# Patient Record
Sex: FEMALE | URBAN - METROPOLITAN AREA
[De-identification: names, ages, dates, MRNs, and addresses within clinical notes are randomized per-mention and may not be internally consistent; named-entity substitution may affect disease eponyms.]

---

## 2017-08-22 ENCOUNTER — EMERGENCY (EMERGENCY)
Facility: HOSPITAL | Age: 22
LOS: 1 days | Discharge: HOME | End: 2017-08-22
Attending: EMERGENCY MEDICINE | Admitting: EMERGENCY MEDICINE
Payer: MEDICAID

## 2017-08-22 VITALS
TEMPERATURE: 98 F | SYSTOLIC BLOOD PRESSURE: 124 MMHG | OXYGEN SATURATION: 100 % | HEART RATE: 87 BPM | DIASTOLIC BLOOD PRESSURE: 83 MMHG | RESPIRATION RATE: 16 BRPM

## 2017-08-22 VITALS
DIASTOLIC BLOOD PRESSURE: 78 MMHG | SYSTOLIC BLOOD PRESSURE: 114 MMHG | HEART RATE: 66 BPM | RESPIRATION RATE: 16 BRPM | OXYGEN SATURATION: 100 %

## 2017-08-22 LAB
ALBUMIN SERPL ELPH-MCNC: 4.3 G/DL — SIGNIFICANT CHANGE UP (ref 3.3–5)
ALP SERPL-CCNC: 38 U/L — LOW (ref 40–120)
ALT FLD-CCNC: 20 U/L — SIGNIFICANT CHANGE UP (ref 4–33)
APPEARANCE UR: CLEAR — SIGNIFICANT CHANGE UP
AST SERPL-CCNC: 17 U/L — SIGNIFICANT CHANGE UP (ref 4–32)
BACTERIA # UR AUTO: SIGNIFICANT CHANGE UP
BASOPHILS # BLD AUTO: 0.04 K/UL — SIGNIFICANT CHANGE UP (ref 0–0.2)
BASOPHILS NFR BLD AUTO: 0.3 % — SIGNIFICANT CHANGE UP (ref 0–2)
BILIRUB SERPL-MCNC: 0.2 MG/DL — SIGNIFICANT CHANGE UP (ref 0.2–1.2)
BILIRUB UR-MCNC: NEGATIVE — SIGNIFICANT CHANGE UP
BLOOD UR QL VISUAL: HIGH
BUN SERPL-MCNC: 13 MG/DL — SIGNIFICANT CHANGE UP (ref 7–23)
CALCIUM SERPL-MCNC: 9.3 MG/DL — SIGNIFICANT CHANGE UP (ref 8.4–10.5)
CHLORIDE SERPL-SCNC: 100 MMOL/L — SIGNIFICANT CHANGE UP (ref 98–107)
CO2 SERPL-SCNC: 23 MMOL/L — SIGNIFICANT CHANGE UP (ref 22–31)
COLOR SPEC: HIGH
CREAT SERPL-MCNC: 0.64 MG/DL — SIGNIFICANT CHANGE UP (ref 0.5–1.3)
EOSINOPHIL # BLD AUTO: 0.07 K/UL — SIGNIFICANT CHANGE UP (ref 0–0.5)
EOSINOPHIL NFR BLD AUTO: 0.6 % — SIGNIFICANT CHANGE UP (ref 0–6)
GLUCOSE SERPL-MCNC: 79 MG/DL — SIGNIFICANT CHANGE UP (ref 70–99)
GLUCOSE UR-MCNC: NEGATIVE — SIGNIFICANT CHANGE UP
HCT VFR BLD CALC: 45.5 % — HIGH (ref 34.5–45)
HGB BLD-MCNC: 15.2 G/DL — SIGNIFICANT CHANGE UP (ref 11.5–15.5)
IMM GRANULOCYTES # BLD AUTO: 0.04 # — SIGNIFICANT CHANGE UP
IMM GRANULOCYTES NFR BLD AUTO: 0.3 % — SIGNIFICANT CHANGE UP (ref 0–1.5)
KETONES UR-MCNC: SIGNIFICANT CHANGE UP
LEUKOCYTE ESTERASE UR-ACNC: SIGNIFICANT CHANGE UP
LYMPHOCYTES # BLD AUTO: 34.4 % — SIGNIFICANT CHANGE UP (ref 13–44)
LYMPHOCYTES # BLD AUTO: 4.36 K/UL — HIGH (ref 1–3.3)
MCHC RBC-ENTMCNC: 30.2 PG — SIGNIFICANT CHANGE UP (ref 27–34)
MCHC RBC-ENTMCNC: 33.4 % — SIGNIFICANT CHANGE UP (ref 32–36)
MCV RBC AUTO: 90.5 FL — SIGNIFICANT CHANGE UP (ref 80–100)
MONOCYTES # BLD AUTO: 0.91 K/UL — HIGH (ref 0–0.9)
MONOCYTES NFR BLD AUTO: 7.2 % — SIGNIFICANT CHANGE UP (ref 2–14)
MUCOUS THREADS # UR AUTO: SIGNIFICANT CHANGE UP
NEUTROPHILS # BLD AUTO: 7.25 K/UL — SIGNIFICANT CHANGE UP (ref 1.8–7.4)
NEUTROPHILS NFR BLD AUTO: 57.2 % — SIGNIFICANT CHANGE UP (ref 43–77)
NITRITE UR-MCNC: NEGATIVE — SIGNIFICANT CHANGE UP
NRBC # FLD: 0 — SIGNIFICANT CHANGE UP
PH UR: 6 — SIGNIFICANT CHANGE UP (ref 4.6–8)
PLATELET # BLD AUTO: 263 K/UL — SIGNIFICANT CHANGE UP (ref 150–400)
PMV BLD: 9.4 FL — SIGNIFICANT CHANGE UP (ref 7–13)
POTASSIUM SERPL-MCNC: 4.5 MMOL/L — SIGNIFICANT CHANGE UP (ref 3.5–5.3)
POTASSIUM SERPL-SCNC: 4.5 MMOL/L — SIGNIFICANT CHANGE UP (ref 3.5–5.3)
PROT SERPL-MCNC: 7.6 G/DL — SIGNIFICANT CHANGE UP (ref 6–8.3)
PROT UR-MCNC: 100 — HIGH
RBC # BLD: 5.03 M/UL — SIGNIFICANT CHANGE UP (ref 3.8–5.2)
RBC # FLD: 12 % — SIGNIFICANT CHANGE UP (ref 10.3–14.5)
RBC CASTS # UR COMP ASSIST: >50 — HIGH (ref 0–?)
SODIUM SERPL-SCNC: 140 MMOL/L — SIGNIFICANT CHANGE UP (ref 135–145)
SP GR SPEC: 1.03 — HIGH (ref 1–1.03)
SQUAMOUS # UR AUTO: SIGNIFICANT CHANGE UP
UROBILINOGEN FLD QL: NORMAL E.U. — SIGNIFICANT CHANGE UP (ref 0.1–0.2)
WBC # BLD: 12.67 K/UL — HIGH (ref 3.8–10.5)
WBC # FLD AUTO: 12.67 K/UL — HIGH (ref 3.8–10.5)
WBC UR QL: >50 — HIGH (ref 0–?)

## 2017-08-22 PROCEDURE — 99285 EMERGENCY DEPT VISIT HI MDM: CPT

## 2017-08-22 RX ORDER — OXYCODONE AND ACETAMINOPHEN 5; 325 MG/1; MG/1
1 TABLET ORAL ONCE
Qty: 0 | Refills: 0 | Status: DISCONTINUED | OUTPATIENT
Start: 2017-08-22 | End: 2017-08-22

## 2017-08-22 RX ORDER — SODIUM CHLORIDE 9 MG/ML
1000 INJECTION INTRAMUSCULAR; INTRAVENOUS; SUBCUTANEOUS ONCE
Qty: 0 | Refills: 0 | Status: COMPLETED | OUTPATIENT
Start: 2017-08-22 | End: 2017-08-22

## 2017-08-22 RX ORDER — ACETAMINOPHEN 500 MG
1000 TABLET ORAL ONCE
Qty: 0 | Refills: 0 | Status: COMPLETED | OUTPATIENT
Start: 2017-08-22 | End: 2017-08-22

## 2017-08-22 RX ADMIN — Medication 400 MILLIGRAM(S): at 20:15

## 2017-08-22 RX ADMIN — SODIUM CHLORIDE 1000 MILLILITER(S): 9 INJECTION INTRAMUSCULAR; INTRAVENOUS; SUBCUTANEOUS at 20:11

## 2017-08-22 NOTE — ED ADULT NURSE NOTE - CHPI ED SYMPTOMS NEG
no chills/no nausea/no vomiting/no tingling/no pain/no numbness/no decreased eating/drinking/no weakness

## 2017-08-22 NOTE — ED PROVIDER NOTE - PHYSICAL EXAMINATION
VS:  unremarkable    GEN - mild distress R lower back pain; A+O x3   HEAD - NC/AT     ENT - PEERL, EOMI, mucous membranes dry , no discharge      NECK: Neck supple, non-tender without lymphadenopathy, no masses, no JVD  PULM - CTA b/l,  symmetric breath sounds  COR -  normal heart sounds    ABD - , ND, NT, soft, no guarding, no rebound, no masses    BACK - no CVA tenderness, nontender spine   R SI area joint ttp.  (+)R PVM spasm.    EXTREMS - no edema, no deformity, warm and well perfused    SKIN - no rash or bruising      NEUROLOGIC - alert, CN 2-2 intact, sensation nl, motor 5/5 RUE/LUE/RLE/LLE.  Ambulatory.    IMP: VS:  unremarkable    GEN - mild distress R lower back pain; A+O x3   HEAD - NC/AT     ENT - PEERL, EOMI, mucous membranes dry , no discharge      NECK: Neck supple, non-tender without lymphadenopathy, no masses, no JVD  PULM - CTA b/l,  symmetric breath sounds  COR -  normal heart sounds    ABD - , ND, NT, soft, no guarding, no rebound, no masses    BACK - no CVA tenderness, nontender spine   R SI area joint ttp.  (+)R PVM spasm.    EXTREMS - no edema, no deformity, warm and well perfused    SKIN - no rash or bruising      NEUROLOGIC - alert, CN 2-2 intact, sensation nl, motor 5/5 RUE/LUE/RLE/LLE.  Ambulatory.

## 2017-08-22 NOTE — ED SUB INTERN NOTE - NS MSEMN COMPLAINT FT
23yo F w PMH of R side sciatic here s/p fall. Pt fell walking to bathroom after waking up this AM. She felt dizzy and bilateral legs became weak, she fell backwards on hips/glutes. Denies LOC and hitting head. Endorses left sided HA this AM that has since resolved and blurry vision. After falling pt took vicodin for back pain, but it provided minimal relief. Pain is on right side and radiates down leg, same as normal sciatic pain. Currently denies leg weakness 23yo F w PMH of R side sciatic here s/p fall. Pt fell walking to bathroom after waking up this AM. She felt dizzy and bilateral legs became weak, she fell backwards on hips/glutes. Denies LOC and hitting head. Endorses left sided HA this AM that has since resolved and blurry vision. After falling pt took vicodin for back pain, but it provided minimal relief. Pain is on right side and radiates down leg, same as normal sciatic pain. Currently denies leg weakness. Patient treated in [facility] during college but has not seen doctor since graduating this Summer, recently stopped PT for this reason. Has been treated with a muscle relaxer and vicodin for sciatic pain. Currently on period 21yo F w PMH of R side sciatic here s/p fall. Pt fell walking to bathroom after waking up this AM. She felt dizzy and legs became weak, fell backwards on hips/glutes. Denies LOC and hitting head. Endorses blurry vision ass w event. After falling pt took vicodin for back pain, but it provided minimal relief. Back pain on right side and radiates down leg, same as normal sciatic pain. Currently denies leg weakness. Patient treated in [facility] during college but has not seen doctor since graduating this Summer, recently stopped PT for this reason. Treated with a muscle relaxer and vicodin for sciatic pain. No other medical problems

## 2017-08-22 NOTE — ED SUB INTERN NOTE - MEDICAL DECISION MAKING DETAILS
21yo F w hx of sciatic s/p fall after waking up this AM. Physical exam significant for right sided SI tenderness, otherwise no focal neurological signs.  Causes of weakness/fall/dizziness include neurologically mediated (vaso-vagal syncope), orthostatic/hypovolemic, and cardiogenic (arrythmia). Mechanical fall also possible given hx of sciatic and discontinuation of physical therapy. Will obtain pregnancy test as well.  Plan: CBC, CMP, IVF, tylenol PO, EKG. No need for back imaging or head imaging at this time given that pt did not hit head and no surgical intervention required as patient is still ambulatory.

## 2017-08-22 NOTE — ED PROVIDER NOTE - OBJECTIVE STATEMENT
22F p/w syncope - pt got up out of bed to go to bathroom today, and felt dizzy then passed out, landing on her bottom, which worsened her chronic low back pain.  She's able to walk but with a limp.  Pt took some of her "Muscle relaxer" as well as pain medicine prior to driving here.  [facility] prior to event.  [place] is now here for the duration, having graduated from college.

## 2017-08-22 NOTE — ED PROVIDER NOTE - PROGRESS NOTE DETAILS
Luis: Sign out from Dr Kraft 8p- pt ambulating without limp.  requesting to go home.  lab shows wbc 12.67 without left shift.  EKG no arrhythmia.    Dx back pain. f/u with pcp, pain management and cardio.  left ambulatory.  motrin for pain. Luis: Sign out from Dr Kraft 8p- pt ambulating without limp.  requesting to go home.  lab shows wbc 12.67 without left shift. ua blood- having her period, no sign of infection  EKG no arrhythmia.    Dx back pain. f/u with pcp, pain management and cardio.  left ambulatory.  motrin for pain.

## 2017-08-22 NOTE — ED PROVIDER NOTE - MEDICAL DECISION MAKING DETAILS
22F p/w syncope and acute on chronic low back pain after falling on her bottom.  Rx IV tylenol (Pt driving), fluids, check labs, EKG, UCG.  Reassess, spine clinic f/u info given.  If all acceptable, discharge home, follow up with your Medical Doctor within 1 week.

## 2017-08-22 NOTE — ED SUB INTERN NOTE - PHYSICAL EXAMINATION
VSS  Gen: NAD, A&Ox3  HEENT: head atraumatic, NCAT, MMM, PERRLA  Neck: supple, no LAD  Pulm: CTAB VSS  Gen: NAD, A&Ox3  HEENT: head atraumatic, NCAT, MMM, PERRLA, sclera nonicteric  Neck: supple, no LAD  Pulm: CTAB, no rales/rhonchi/wheezes  CV: RRR, nl S1 S2, no m/r/g  Abd: nontender, nondistended, normal BS, no rebound/guarding  Ext: no cyanosis, clubbing, edema  Neuro: CN II-XII intact, strength 5/5 in bilateral UE, strength 4+/5 in right LE, 5/5 in LLE, sensation intact to light touch throughout

## 2017-08-24 PROBLEM — Z00.00 ENCOUNTER FOR PREVENTIVE HEALTH EXAMINATION: Status: ACTIVE | Noted: 2017-08-24

## 2017-08-25 ENCOUNTER — APPOINTMENT (OUTPATIENT)
Dept: ORTHOPEDIC SURGERY | Facility: CLINIC | Age: 22
End: 2017-08-25

## 2022-06-30 NOTE — ED SUB INTERN NOTE - RESPIRATORY NEGATIVE STATEMENT, MLM
I called the patient and left a voice message  Sigmoid colon biopsy shows benign tissue  No recall colonoscopy    Copy D merary Trujillo
no chest pain, no cough, and no shortness of breath.